# Patient Record
Sex: MALE | Race: WHITE | NOT HISPANIC OR LATINO | URBAN - METROPOLITAN AREA
[De-identification: names, ages, dates, MRNs, and addresses within clinical notes are randomized per-mention and may not be internally consistent; named-entity substitution may affect disease eponyms.]

---

## 2023-11-20 ENCOUNTER — APPOINTMENT (OUTPATIENT)
Dept: SPORTS MEDICINE | Facility: CLINIC | Age: 43
End: 2023-11-20

## 2023-11-20 NOTE — PROGRESS NOTES
"{AMB ORTHO VISIT TYPE:71436}  History Of Present Illness  Mark Anthony Tapia is a 43 y.o. male presenting with {Ortho CC:13286}.     Past Medical History  He has a past medical history of Unspecified asthma with (acute) exacerbation.    Surgical History  He has no past surgical history on file.     Social History  He has no history on file for tobacco use, alcohol use, and drug use.    Family History  No family history on file.     Allergies  Patient has no allergy information on record.    Review of Systems  Review of Systems     Last Recorded Vitals  There were no vitals taken for this visit.     Physical Exam    Ortho Exam    Imaging and Diagnostics Review:  Radiology  {[facility] RADIOLOGY DATA REVIEWED:51338}    Findings:  {[facility] sports knee radiology imaging findings:00055}    Assessment   No diagnosis found.    Plan   Treatment or Intervention:  {[facility] KNEE TREATMENT OR INTERVENTION:49809}    Diagnostic studies:  {[facility] DIAGNOSTIC STUDY PLAN:05295::\"Xray will be performed at the follow-up visit.\"}    XR lumbar spine complete 4+ views  PROCEDURE:         SPINE LUMBOSACRAL MIN 4 VIEW - WXR  0034  REASON FOR EXAM: m54.16 RADICULOPATHY, LUMBAR REGION    RESULT: MRN: [id number]  Patient Name: MARK ANTHONY TAPIA    STUDY:  SPINE LUMBOSACRAL MIN 4 VIEW; 12/13/2022 5:34 pm    INDICATION:  m54.16 RADICULOPATHY, LUMBAR REGION. Low back pain for about 1 month. No  history of injury.    COMPARISON:  None.    ACCESSION NUMBER(S):  [id number]    ORDERING CLINICIAN:  DEXTER BAI    TECHNIQUE:  Lumbar spine AP, lateral, and flexion-extension views    FINDINGS:  No fractures or destructive lesions are identified. There is a minimal  lumbar levoscoliosis. There is anterior marginal spurring from T9 through  S1. There is no pathologic subluxation identified on the flexion and  extension views.    IMPRESSION:  No acute pathologic findings are identified.  Lower thoracic and lumbar spondylosis.  Dictation workstation:   " "[id number]    Original Interpreting Physician:   CHIQUI BAZAN M.D.  Original Transcribed by/Date: MMODAL Dec 13 2022  5:14P  Original Electronically Signed by/Date: CHIQUI BAZAN M.D. Dec 14 2022  9:09A    Addendum Interpreting Physician:  Addendum Transcribed by/Date: NO ADDENDUM  Addendum Electronically Signed by/Date:       Activity Instructions, Restrictions, and Accommodations:  {[facility] AMB ACTIVITY PLAN:30550::\"The family has been provided a note (after visit summary) outlining all current activity instructions, restrictions, and accommodations.\"}    Consultations/Referrals:  {[facility] AMB CONSULTATION OR REFERRAL:21526::\"Physical therapy\"}    Follow-up:  Follow up as needed    JUSTICE NGUYEN on 11/20/23 at 7:24 AM.     Lalo Costello CNP   "